# Patient Record
Sex: MALE | Race: WHITE | NOT HISPANIC OR LATINO | Employment: STUDENT | URBAN - METROPOLITAN AREA
[De-identification: names, ages, dates, MRNs, and addresses within clinical notes are randomized per-mention and may not be internally consistent; named-entity substitution may affect disease eponyms.]

---
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
## 2025-07-07 ENCOUNTER — HOSPITAL ENCOUNTER (OUTPATIENT)
Facility: HOSPITAL | Age: 20
Discharge: HOME | End: 2025-07-07
Attending: EMERGENCY MEDICINE | Admitting: EMERGENCY MEDICINE
Payer: MEDICAID

## 2025-07-07 VITALS
OXYGEN SATURATION: 96 % | BODY MASS INDEX: 23.96 KG/M2 | WEIGHT: 158.1 LBS | HEART RATE: 73 BPM | DIASTOLIC BLOOD PRESSURE: 82 MMHG | TEMPERATURE: 98.6 F | HEIGHT: 68 IN | SYSTOLIC BLOOD PRESSURE: 131 MMHG | RESPIRATION RATE: 18 BRPM

## 2025-07-07 DIAGNOSIS — S01.81XA FACIAL LACERATION, INITIAL ENCOUNTER: Primary | ICD-10-CM

## 2025-07-07 PROCEDURE — G0463 HOSPITAL OUTPT CLINIC VISIT: HCPCS

## 2025-07-07 NOTE — Clinical Note
[facility] [place] [id number] [place] [address] [place] [address]  Phone: [phone]    Luis Felipe Up was seen and treated in our emergency department on 7/7/2025.  He may return to work on 07/08/2025.  May return sooner if feeling well.       Thank you for choosing [facility].    Lilly Rosado, APRN

## 2025-07-07 NOTE — FSED PROVIDER NOTE
How Severe Is It?: moderate Subjective   History of Present Illness  Patient is a 20-year-old male who presents today for a laceration below his lower lip.  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  He denies loss of consciousness, blurred vision, diplopia, blood thinners, headache.  Bleeding is controlled.  Tetanus vaccination is up-to-date.        Review of Systems    Past Medical History:   Diagnosis Date    Torn meniscus        No Known Allergies    Past Surgical History:   Procedure Laterality Date    KNEE ARTHROSCOPY Left 11/19/2021    Procedure: KNEE ARTHROSCOPY with lateral meniscus repair;  Surgeon: Bipin Collins MD;  Location: [facility] MAIN OR;  Service: Orthopedics;  Laterality: Left;    NO PAST SURGERIES      WISDOM TOOTH EXTRACTION  2021       Family History   Problem Relation Age of Onset    No Known Problems Mother     Thyroid disease Father        Social History     Socioeconomic History    Marital status: Single   Tobacco Use    Smoking status: Never    Smokeless tobacco: Never   Vaping Use    Vaping status: Never Used   Substance and Sexual Activity    Alcohol use: Never    Drug use: Never    Sexual activity: Defer           Objective   Physical Exam  Vitals and nursing note reviewed.   Constitutional:       General: He is not in acute distress.     Appearance: Normal appearance. He is not ill-appearing, toxic-appearing or diaphoretic.   HENT:      Head: Normocephalic.      Right Ear: Tympanic membrane, ear canal and external ear normal. There is no impacted cerumen.      Left Ear: Tympanic membrane, ear canal and external ear normal. There is no impacted cerumen.      Nose: Nose normal.      Mouth/Throat:      Mouth: Mucous membranes are moist.   Eyes:      Extraocular Movements: Extraocular movements intact.      Conjunctiva/sclera: Conjunctivae normal.      Pupils: Pupils are equal, round, and  Is This A New Presentation, Or A Follow-Up?: Nasal Fracture reactive to light.   Cardiovascular:      Rate and Rhythm: Normal rate and regular rhythm.      Pulses: Normal pulses.      Heart sounds: Normal heart sounds.   Pulmonary:      Effort: Pulmonary effort is normal.      Breath sounds: Normal breath sounds.   Musculoskeletal:         General: Signs of injury present. No swelling, tenderness or deformity. Normal range of motion.      Cervical back: Normal range of motion.      Right lower leg: No edema.      Left lower leg: No edema.   Skin:     General: Skin is warm.      Capillary Refill: Capillary refill takes less than 2 seconds.      Coloration: Skin is not jaundiced or pale.      Findings: No bruising, erythema, lesion or rash.   Neurological:      General: No focal deficit present.      Mental Status: He is alert.   Psychiatric:         Mood and Affect: Mood normal.         Behavior: Behavior normal.         Thought Content: Thought content normal.         Judgment: Judgment normal.         Laceration Repair    Date/Time: 7/7/2025 9:46 AM    Performed by: Lilly Rosado APRN  Authorized by: Robert Adams MD    Consent:     Consent obtained:  Verbal    Consent given by:  Patient    Risks, benefits, and alternatives were discussed: yes      Risks discussed:  Infection, pain, retained foreign body, tendon damage, poor cosmetic result, need for additional repair, nerve damage, poor wound healing and vascular damage    Alternatives discussed:  No treatment and delayed treatment  Universal protocol:     Procedure explained and questions answered to patient or proxy's satisfaction: yes      Patient identity confirmed:  Arm band and verbally with patient  Anesthesia:     Anesthesia method:  None  Laceration details:     Location:  Face    Facial location: Below lower lip.    Length (cm):  0.5  Exploration:     Limited defect created (wound extended): no      Imaging outcome: foreign body not noted      Wound exploration: wound explored through full range of  When Did The Injury Occur?: 08\31\2024 motion and entire depth of wound visualized      Contaminated: yes    Treatment:     Area cleansed with:  Chlorhexidine and Shur-Clens    Amount of cleaning:  Standard    Irrigation solution:  Sterile saline    Irrigation method:  Tap    Visualized foreign bodies/material removed: no      Debridement:  None    Undermining:  None  Skin repair:     Repair method:  Tissue adhesive  Approximation:     Approximation:  Close  Repair type:     Repair type:  Simple  Post-procedure details:     Dressing:  Open (no dressing)    Procedure completion:  Tolerated well, no immediate complications             ED Course                                           Medical Decision Making  Patient is a 20-year-old male who presents today for a laceration below his lower lip.  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  He denies loss of consciousness, blurred vision, diplopia, blood thinners, headache.  Bleeding is controlled.  Tetanus vaccination is up-to-date.    Upon exam patient is awake and alert, nontoxic-appearing, appears in no acute distress, and is answering questions appropriately.  Lung sounds are clear and equal bilaterally.  Heart is normal rate and rhythm.  Mucous membranes are moist.  Neuroexam is normal.  PERRL.  I do not appreciate any vertebral tenderness.  He has a 0.5 centimeter laceration below his lower lip.  It does not cross the frenulum border.  Bleeding is controlled.  He has a small laceration to the inside of his lip as well that has already closed.  I was able to repair the small laceration with skin adhesive.  I advised him to swish and spit warm salty water at a one-to-one ratio.  We discussed strict return precautions I advised him to follow-up with primary care, and return to the ER with any new or worsening symptoms.    Problems Addressed:  Facial laceration, initial encounter: acute  illness or injury        Final diagnoses:   Facial laceration, initial encounter       ED Disposition  ED Disposition       ED Disposition   Discharge    Condition   Stable    Comment   --               Chris Merritt MD  [address] [place] [address] [place] [id number] [phone]               Medication List      No changes were made to your prescriptions during this visit.

## (undated) DEVICE — GLV SURG SIGNATURE ESSENTIAL PF LTX SZ8.5

## (undated) DEVICE — CUFF TOURNI 1BLADDER 1PRT 30IN STRL

## (undated) DEVICE — PAD UNDERCAST WYTEX 4IN 4YD LF STRL

## (undated) DEVICE — WEBRIL* CAST PADDING: Brand: DEROYAL

## (undated) DEVICE — SUT MNCRYL 3/0 PS2 18IN Y497G

## (undated) DEVICE — DRAPE SHEET ULTRAGARD: Brand: MEDLINE

## (undated) DEVICE — COVER,MAYO STAND,STERILE: Brand: MEDLINE

## (undated) DEVICE — PUSHER KNOT SUTR/CUT W/PORTAL SKID

## (undated) DEVICE — GLV SURG SIGNATURE ESSENTIAL PF LTX SZ8

## (undated) DEVICE — PAD,ABDOMINAL,5"X9",STERILE,LF,1/PK: Brand: MEDLINE INDUSTRIES, INC.

## (undated) DEVICE — PAD CAST SPECIST 6IN STRL

## (undated) DEVICE — 3M™ STERI-STRIP™ REINFORCED ADHESIVE SKIN CLOSURES, R1547, 1/2 IN X 4 IN (12 MM X 100 MM), 6 STRIPS/ENVELOPE: Brand: 3M™ STERI-STRIP™

## (undated) DEVICE — UNDERGLV SURG BIOGEL INDICAT PI SZ8 BLU

## (undated) DEVICE — U-DRAPE: Brand: CONVERTORS

## (undated) DEVICE — GLV SURG SENSICARE PI ORTHO SZ8 LF STRL

## (undated) DEVICE — BNDG ELAS ELITE V/CLOSE 6IN 5YD LF STRL

## (undated) DEVICE — GAUZE,SPONGE,FLUFF,6"X6.75",STRL,5/TRAY: Brand: MEDLINE

## (undated) DEVICE — ESMARK: Brand: DEROYAL

## (undated) DEVICE — PK KN ARTHROSCOPY 50

## (undated) DEVICE — BLD SHAVER EXCALIBUR CRV 4MM

## (undated) DEVICE — NDL HYPO PRECISIONGLIDE/REG 18G 11/2 PNK

## (undated) DEVICE — NDL HYPO PRECISIONGLIDE REG 22G 1 1/2

## (undated) DEVICE — TBG ARTHSCP PUMP W CONN/REDUC 8FT

## (undated) DEVICE — TBG ARTHSCP PT W CONN/REDUC 8FT

## (undated) DEVICE — SLV SCD CALF HEMOFORCE DVT THERP REPROC MD

## (undated) DEVICE — UNDYED BRAIDED (POLYGLACTIN 910), SYNTHETIC ABSORBABLE SUTURE: Brand: COATED VICRYL

## (undated) DEVICE — TBG ARTHSCP DUALWAVE

## (undated) DEVICE — TOWEL,OR,DSP,ST,WHITE,DLX,4/PK,20PK/CS: Brand: MEDLINE

## (undated) DEVICE — SOLUTION,WATER,IRRIGATION,1000ML,STERILE: Brand: MEDLINE

## (undated) DEVICE — KT SURG TURNOVER 050